# Patient Record
Sex: FEMALE | Race: WHITE | Employment: PART TIME | URBAN - METROPOLITAN AREA
[De-identification: names, ages, dates, MRNs, and addresses within clinical notes are randomized per-mention and may not be internally consistent; named-entity substitution may affect disease eponyms.]

---

## 2021-09-20 ENCOUNTER — VIRTUAL VISIT (OUTPATIENT)
Dept: INTERNAL MEDICINE CLINIC | Age: 26
End: 2021-09-20
Payer: MEDICAID

## 2021-09-20 DIAGNOSIS — F98.8 ATTENTION DEFICIT DISORDER, UNSPECIFIED HYPERACTIVITY PRESENCE: Primary | ICD-10-CM

## 2021-09-20 DIAGNOSIS — Z91.018 MULTIPLE FOOD ALLERGIES: ICD-10-CM

## 2021-09-20 DIAGNOSIS — Z00.00 ANNUAL PHYSICAL EXAM: ICD-10-CM

## 2021-09-20 PROCEDURE — 99213 OFFICE O/P EST LOW 20 MIN: CPT | Performed by: PHYSICIAN ASSISTANT

## 2021-09-20 RX ORDER — METHYLPHENIDATE HCL
10 POWDER (GRAM) MISCELLANEOUS DAILY
Qty: 0.3 G | Refills: 0 | Status: SHIPPED | OUTPATIENT
Start: 2021-10-20 | End: 2022-02-04 | Stop reason: SDUPTHER

## 2021-09-20 RX ORDER — METHYLPHENIDATE HCL
10 POWDER (GRAM) MISCELLANEOUS DAILY
Qty: 0.3 G | Refills: 0 | Status: SHIPPED | OUTPATIENT
Start: 2021-09-20 | End: 2022-02-08 | Stop reason: SDUPTHER

## 2021-09-20 RX ORDER — METHYLPHENIDATE HCL
10 POWDER (GRAM) MISCELLANEOUS DAILY
Qty: 0.3 G | Refills: 0 | Status: SHIPPED | OUTPATIENT
Start: 2021-11-20 | End: 2022-02-08 | Stop reason: SDUPTHER

## 2021-09-20 NOTE — PROGRESS NOTES
1. Have you been to the ER, urgent care clinic since your last visit? Hospitalized since your last visit? No    2. Have you seen or consulted any other health care providers outside of the [address] [place] [address] since your last visit? Include any pap smears or colon screening.  No   Chief Complaint   Patient presents with    Establish Care            please send link to [phone]

## 2021-09-20 NOTE — PROGRESS NOTES
Fernando Martínez is a 27 y.o. female who was seen by synchronous (real-time) audio-video technology on 9/20/2021    Consent: Fernando Martínez, who was seen by synchronous (real-time) audio-video technology, and/or her healthcare decision maker, is aware that this patient-initiated, Telehealth encounter on 9/20/2021 is a billable service, with coverage as determined by her insurance carrier. She is aware that she may receive a bill and has provided verbal consent to proceed: YES  Subjective:   Fernando Martínez is a 27 y.o. female who was seen for [facility] patient. New to [facility]. ADD - Ritalin 10 mg once/day for ADD - dx as an 3 yo - has to have compounded at [id number] due to food allergies. Last filled in May. Needs refill   Taking Ritalin for [facility] program for [employer]. Denies side effects including palpitations, anxiety, & insomnia. Feels more productive on medication. Feels that dose is effective. BP is controlled. Pt reports vitals were normal at GYN   VP WNL    Needs referral to new Allergist per new insurance. Review of Systems   Respiratory: Negative for shortness of breath. Cardiovascular: Negative for chest pain and palpitations. Neurological: Negative for seizures. Psychiatric/Behavioral: Negative for depression. The patient is not nervous/anxious and does not have insomnia.        Objective:     Patient-Reported Vitals 9/20/2021   Patient-Reported Weight 100   Patient-Reported Height 50      General: alert, cooperative, no distress   Mental  status: normal mood, behavior, speech, dress, motor activity, and thought processes, able to follow commands   HENT: NCAT   Neck: no visualized mass   Resp: no respiratory distress   Neuro: no gross deficits   Skin: no discoloration or lesions of concern on visible areas   Psychiatric: normal affect, consistent with stated mood, no evidence of hallucinations     Assessment & Plan:     Encounter Diagnoses ICD-10-CM ICD-9-CM   1. Attention deficit disorder, unspecified hyperactivity presence  F98.8 314.00   2. Multiple food allergies  Z91.018 V15.05   3. Annual physical exam  Z00.00 V70.0     Orders Placed This Encounter    CBC W/O DIFF    METABOLIC PANEL, COMPREHENSIVE    TSH 3RD GENERATION    HEMOGLOBIN A1C WITH EAG    LIPID PANEL    REFERRAL TO ALLERGY    Methylphenidate powd    Methylphenidate powd    Methylphenidate powd   Refills as above    Schedule CE soon. We discussed the expected course, resolution and complications of the diagnosis(es) in detail. Medication risks, benefits, costs, interactions, and alternatives were discussed as indicated. I advised her to contact the office if her condition worsens, changes or fails to improve as anticipated. She expressed understanding with the diagnosis(es) and plan. Monika Carrasquillo is a 27 y.o. female who was evaluated by a video visit encounter for concerns as above. Patient identification was verified prior to start of the visit. A caregiver was present when appropriate. Due to this being a TeleHealth encounter (During [facility] public health emergency), evaluation of the following organ systems was limited: Vitals/Constitutional/EENT/Resp/CV/GI//MS/Neuro/Skin/Heme-Lymph-Imm. Pursuant to the emergency declaration under the [facility] waiver authority and the [employer] General Act, this Virtual  Visit was conducted, with patient's (and/or legal guardian's) consent, to reduce the patient's risk of exposure to COVID-19 and provide necessary medical care. Services were provided through a video synchronous discussion virtually to substitute for in-person clinic visit. Patient and provider were located at their individual homes.       Jacquelyn Muhammad PA-C

## 2022-02-04 DIAGNOSIS — F98.8 ATTENTION DEFICIT DISORDER, UNSPECIFIED HYPERACTIVITY PRESENCE: ICD-10-CM

## 2022-02-04 NOTE — TELEPHONE ENCOUNTER
Future Appointments:  Future Appointments   Date Time Provider Bobby Timmons   2/8/2022  9:45 AM JUSTIN Jeffries BS AMB        Last Appointment With Me:  9/20/2021     Requested Prescriptions     Pending Prescriptions Disp Refills    Methylphenidate powd 0.3 g 0     Sig: Take 10 mg by mouth daily. Max Daily Amount: 10 mg.

## 2022-02-07 RX ORDER — METHYLPHENIDATE HCL
10 POWDER (GRAM) MISCELLANEOUS DAILY
Qty: 0.3 G | Refills: 0 | Status: SHIPPED | OUTPATIENT
Start: 2022-02-07 | End: 2022-06-10 | Stop reason: SDUPTHER

## 2022-02-08 ENCOUNTER — OFFICE VISIT (OUTPATIENT)
Dept: INTERNAL MEDICINE CLINIC | Age: 27
End: 2022-02-08
Payer: MEDICAID

## 2022-02-08 VITALS
WEIGHT: 103 LBS | RESPIRATION RATE: 16 BRPM | HEART RATE: 81 BPM | OXYGEN SATURATION: 100 % | TEMPERATURE: 98.3 F | BODY MASS INDEX: 20.22 KG/M2 | SYSTOLIC BLOOD PRESSURE: 116 MMHG | HEIGHT: 60 IN | DIASTOLIC BLOOD PRESSURE: 64 MMHG

## 2022-02-08 DIAGNOSIS — F98.8 ATTENTION DEFICIT DISORDER, UNSPECIFIED HYPERACTIVITY PRESENCE: ICD-10-CM

## 2022-02-08 DIAGNOSIS — J30.9 ALLERGIC RHINITIS, UNSPECIFIED SEASONALITY, UNSPECIFIED TRIGGER: Primary | ICD-10-CM

## 2022-02-08 PROCEDURE — 99213 OFFICE O/P EST LOW 20 MIN: CPT | Performed by: PHYSICIAN ASSISTANT

## 2022-02-08 RX ORDER — METHYLPHENIDATE HCL
10 POWDER (GRAM) MISCELLANEOUS DAILY
Qty: 0.3 G | Refills: 0 | Status: SHIPPED | OUTPATIENT
Start: 2022-04-07 | End: 2022-06-10 | Stop reason: SDUPTHER

## 2022-02-08 RX ORDER — METHYLPHENIDATE HCL
10 POWDER (GRAM) MISCELLANEOUS DAILY
Qty: 0.3 G | Refills: 0 | Status: SHIPPED | OUTPATIENT
Start: 2022-03-07 | End: 2022-06-10 | Stop reason: SDUPTHER

## 2022-02-08 NOTE — PROGRESS NOTES
1. Have you been to the ER, urgent care clinic since your last visit? Hospitalized since your last visit? No    2. Have you seen or consulted any other health care providers outside of the [address] [place] [address] since your last visit? Include any pap smears or colon screening.  No     Chief Complaint   Patient presents with    Medication Refill

## 2022-02-08 NOTE — PROGRESS NOTES
Alexandria Ritchie is a 27 y.o. female  Chief Complaint   Patient presents with    Medication Refill     Visit Vitals  /64   Pulse 81   Temp 98.3 °F (36.8 °C) (Temporal)   Resp 16   Wt 103 lb (46.7 kg)   SpO2 100%      Health Maintenance Due   Topic Date Due    Hepatitis C Screening  Never done    DTaP/Tdap/Td series (1 - Tdap) Never done    Pap Smear  Never done    COVID-19 Vaccine (2 - Pfizer 3-dose series) 11/28/2021     HPI  ADD follow up - taking Ritalin most days. Planning to start taking daily now due to work. Denies side effects including palpitations, anxiety, & insomnia. Feels more productive on medication. Feels that dose is effective. BP is controlled. Allergies have been worse with having to move her 6 chickens into the attic due to bird flu risk recommendations from [place]. Planning to move the chickens back outside soon. Loves her chickens - gets eggs of many colors, and they are very affectionate. They wear chicken diapers in the house. ROS  Review of Systems   Constitutional: Negative for fever. HENT: Positive for congestion. Respiratory: Negative for shortness of breath. Cardiovascular: Negative for chest pain and palpitations. Skin: Positive for rash. eczema   Neurological: Negative for dizziness, loss of consciousness and weakness. Psychiatric/Behavioral: The patient is not nervous/anxious. EXAM  Physical Exam  Vitals and nursing note reviewed. Constitutional:       General: She is not in acute distress. Appearance: She is well-developed. HENT:      Head: Normocephalic and atraumatic. Neck:      Vascular: No JVD. Cardiovascular:      Rate and Rhythm: Normal rate and regular rhythm. Heart sounds: Normal heart sounds. Pulmonary:      Effort: Pulmonary effort is normal. No respiratory distress. Breath sounds: Normal breath sounds. Musculoskeletal:      Cervical back: Neck supple. Skin:     General: Skin is warm and dry. Neurological:      Mental Status: She is alert and oriented to person, place, and time. Psychiatric:         Mood and Affect: Mood normal.         Behavior: Behavior normal.         Thought Content: Thought content normal.         Judgment: Judgment normal.       ASSESSMENT/PLAN  Encounter Diagnoses     ICD-10-CM ICD-9-CM   1. Allergic rhinitis, unspecified seasonality, unspecified trigger  J30.9 477.9   2. Attention deficit disorder, unspecified hyperactivity presence  F98.8 314.00     Orders Placed This Encounter    Methylphenidate powd    Methylphenidate powd   Follow up with Allergist as planned.

## 2022-03-19 PROBLEM — J30.9 ALLERGIC RHINITIS, UNSPECIFIED SEASONALITY, UNSPECIFIED TRIGGER: Status: ACTIVE | Noted: 2022-02-08

## 2022-03-19 PROBLEM — F98.8 ATTENTION DEFICIT DISORDER, UNSPECIFIED HYPERACTIVITY PRESENCE: Status: ACTIVE | Noted: 2022-02-08

## 2022-03-19 LAB
ALBUMIN SERPL-MCNC: 4.6 G/DL (ref 3.9–5)
ALBUMIN/GLOB SERPL: 2 {RATIO} (ref 1.2–2.2)
ALP SERPL-CCNC: 64 IU/L (ref 44–121)
ALT SERPL-CCNC: 11 IU/L (ref 0–32)
AST SERPL-CCNC: 15 IU/L (ref 0–40)
BILIRUB SERPL-MCNC: 0.6 MG/DL (ref 0–1.2)
BUN SERPL-MCNC: 13 MG/DL (ref 6–20)
BUN/CREAT SERPL: 18 (ref 9–23)
CALCIUM SERPL-MCNC: 9.6 MG/DL (ref 8.7–10.2)
CHLORIDE SERPL-SCNC: 103 MMOL/L (ref 96–106)
CHOLEST SERPL-MCNC: 202 MG/DL (ref 100–199)
CO2 SERPL-SCNC: 22 MMOL/L (ref 20–29)
CREAT SERPL-MCNC: 0.72 MG/DL (ref 0.57–1)
EGFR: 117 ML/MIN/1.73
ERYTHROCYTE [DISTWIDTH] IN BLOOD BY AUTOMATED COUNT: 12.6 % (ref 11.7–15.4)
EST. AVERAGE GLUCOSE BLD GHB EST-MCNC: 94 MG/DL
GLOBULIN SER CALC-MCNC: 2.3 G/DL (ref 1.5–4.5)
GLUCOSE SERPL-MCNC: 93 MG/DL (ref 65–99)
HBA1C MFR BLD: 4.9 % (ref 4.8–5.6)
HCT VFR BLD AUTO: 42.6 % (ref 34–46.6)
HDLC SERPL-MCNC: 61 MG/DL
HGB BLD-MCNC: 13.7 G/DL (ref 11.1–15.9)
IMP & REVIEW OF LAB RESULTS: NORMAL
INTERPRETATION: NORMAL
LDLC SERPL CALC-MCNC: 126 MG/DL (ref 0–99)
MCH RBC QN AUTO: 29.7 PG (ref 26.6–33)
MCHC RBC AUTO-ENTMCNC: 32.2 G/DL (ref 31.5–35.7)
MCV RBC AUTO: 92 FL (ref 79–97)
PLATELET # BLD AUTO: 377 X10E3/UL (ref 150–450)
POTASSIUM SERPL-SCNC: 4.3 MMOL/L (ref 3.5–5.2)
PROT SERPL-MCNC: 6.9 G/DL (ref 6–8.5)
RBC # BLD AUTO: 4.62 X10E6/UL (ref 3.77–5.28)
SODIUM SERPL-SCNC: 138 MMOL/L (ref 134–144)
TRIGL SERPL-MCNC: 85 MG/DL (ref 0–149)
TSH SERPL DL<=0.005 MIU/L-ACNC: 0.83 UIU/ML (ref 0.45–4.5)
VLDLC SERPL CALC-MCNC: 15 MG/DL (ref 5–40)
WBC # BLD AUTO: 5.8 X10E3/UL (ref 3.4–10.8)

## 2022-03-21 PROBLEM — E78.00 HYPERCHOLESTEREMIA: Status: ACTIVE | Noted: 2022-03-21

## 2022-03-21 NOTE — PROGRESS NOTES
Blood Count, thyroid, liver enzymes, kidney function, sugar levels, and electrolytes are all normal/acceptable. Good! Cholesterol is slightly high. Decrease fried/fatty foods, red meat, butter, oils, and increase cardio exercise.

## 2022-03-24 PROBLEM — E78.00 HYPERCHOLESTEREMIA: Status: ACTIVE | Noted: 2022-03-21

## 2022-06-10 ENCOUNTER — OFFICE VISIT (OUTPATIENT)
Dept: INTERNAL MEDICINE CLINIC | Age: 27
End: 2022-06-10
Payer: MEDICAID

## 2022-06-10 VITALS
OXYGEN SATURATION: 99 % | DIASTOLIC BLOOD PRESSURE: 64 MMHG | BODY MASS INDEX: 19.36 KG/M2 | HEART RATE: 84 BPM | RESPIRATION RATE: 16 BRPM | SYSTOLIC BLOOD PRESSURE: 111 MMHG | HEIGHT: 60 IN | TEMPERATURE: 98.3 F | WEIGHT: 98.6 LBS

## 2022-06-10 DIAGNOSIS — F98.8 ATTENTION DEFICIT DISORDER, UNSPECIFIED HYPERACTIVITY PRESENCE: ICD-10-CM

## 2022-06-10 DIAGNOSIS — B35.4 TINEA CORPORIS: ICD-10-CM

## 2022-06-10 DIAGNOSIS — E78.00 HYPERCHOLESTEREMIA: Primary | ICD-10-CM

## 2022-06-10 PROCEDURE — 99214 OFFICE O/P EST MOD 30 MIN: CPT | Performed by: PHYSICIAN ASSISTANT

## 2022-06-10 RX ORDER — CHLORPHENIRAMINE MALEATE 4 MG
TABLET ORAL 2 TIMES DAILY
Qty: 15 G | Refills: 0 | Status: SHIPPED | OUTPATIENT
Start: 2022-06-10

## 2022-06-10 RX ORDER — METHYLPHENIDATE HCL
10 POWDER (GRAM) MISCELLANEOUS DAILY
Qty: 0.3 G | Refills: 0 | Status: SHIPPED | OUTPATIENT
Start: 2022-06-10 | End: 2022-10-20 | Stop reason: SDUPTHER

## 2022-06-10 RX ORDER — METHYLPHENIDATE HCL
10 POWDER (GRAM) MISCELLANEOUS DAILY
Qty: 0.3 G | Refills: 0 | Status: SHIPPED | OUTPATIENT
Start: 2022-08-10

## 2022-06-10 RX ORDER — METHYLPHENIDATE HCL
10 POWDER (GRAM) MISCELLANEOUS DAILY
Qty: 0.3 G | Refills: 0 | Status: SHIPPED | OUTPATIENT
Start: 2022-07-10

## 2022-06-10 NOTE — PROGRESS NOTES
1. Have you been to the ER, urgent care clinic since your last visit? Hospitalized since your last visit? No    2. Have you seen or consulted any other health care providers outside of the [address] [place] [address] since your last visit? Include any pap smears or colon screening.  Yes        Chief Complaint   Patient presents with    Cholesterol Problem    Rash     to left foot

## 2022-06-10 NOTE — PROGRESS NOTES
Krupa Yip is a 27 y.o. female  Chief Complaint   Patient presents with    Cholesterol Problem    Rash     to left foot     Visit Vitals  /64   Pulse 84   Temp 98.3 °F (36.8 °C) (Temporal)   Resp 16   Ht 5' (1.524 m)   Wt 98 lb 9.6 oz (44.7 kg)   SpO2 99%   BMI 19.26 kg/m²      Health Maintenance Due   Topic Date Due    Hepatitis C Screening  Never done    DTaP/Tdap/Td series (1 - Tdap) Never done    Pap Smear  Never done    COVID-19 Vaccine (2 - Pfizer 3-dose series) 11/28/2021       HPI    Rash on foot x ~2 months. Seems to be somewhat improving with eczema cream. Asymptomatic. No known exposures. They still have their 6 pet chickens in the attic to avoid Bret Flu, but the chickens are healthy. No sign of ringworm. Hx slightly elevated cholesterol, but eating a very healthy diet. Lab Results   Component Value Date/Time    Cholesterol, total 202 (H) 03/18/2022 07:59 AM    HDL Cholesterol 61 03/18/2022 07:59 AM    LDL, calculated 126 (H) 03/18/2022 07:59 AM    VLDL, calculated 15 03/18/2022 07:59 AM    Triglyceride 85 03/18/2022 07:59 AM     ADD follow up - taking Methylphenidate regularly. Denies side effects including palpitations, anxiety, & insomnia. Feels more productive on medication. Feels that dose is effective. BP is controlled. Weight is WNL. Wt Readings from Last 6 Encounters:   06/10/22 98 lb 9.6 oz (44.7 kg)   02/08/22 103 lb (46.7 kg)       ROS  Review of Systems   Constitutional: Negative for fever and malaise/fatigue. Respiratory: Negative for shortness of breath. Cardiovascular: Negative for chest pain and palpitations. Skin: Positive for rash. Negative for itching. Neurological: Negative for dizziness, loss of consciousness and weakness. EXAM  Physical Exam  Vitals and nursing note reviewed. Constitutional:       General: She is not in acute distress. Appearance: She is well-developed. HENT:      Head: Normocephalic and atraumatic. Neck:      Vascular: No JVD. Cardiovascular:      Rate and Rhythm: Normal rate and regular rhythm. Heart sounds: Normal heart sounds. Pulmonary:      Effort: Pulmonary effort is normal. No respiratory distress. Breath sounds: Normal breath sounds. Musculoskeletal:      Cervical back: Neck supple. Skin:     General: Skin is warm and dry. Comments: R foot with faint Rash with central clearing and peripheral red, lacy border. Neurological:      Mental Status: She is alert and oriented to person, place, and time. Psychiatric:         Mood and Affect: Mood normal.         Behavior: Behavior normal.         Thought Content: Thought content normal.         Judgment: Judgment normal.       ASSESSMENT/PLAN  Encounter Diagnoses     ICD-10-CM ICD-9-CM   1. Hypercholesteremia  E78.00 272.0   2. Tinea corporis  B35.4 110.5   3. Attention deficit disorder, unspecified hyperactivity presence  F98.8 314.00     Orders Placed This Encounter    clotrimazole (LOTRIMIN) 1 % topical cream    Refills:   Methylphenidate powd    Methylphenidate powd    Methylphenidate powd   Call [facility] 1 month for Diflucan Rx.

## 2022-08-16 ENCOUNTER — PATIENT MESSAGE (OUTPATIENT)
Dept: INTERNAL MEDICINE CLINIC | Age: 27
End: 2022-08-16

## 2022-08-16 DIAGNOSIS — B35.4 TINEA CORPORIS: Primary | ICD-10-CM

## 2022-08-17 RX ORDER — FLUCONAZOLE 150 MG/1
150 TABLET ORAL
Qty: 6 TABLET | Refills: 0 | Status: SHIPPED | OUTPATIENT
Start: 2022-08-17 | End: 2022-10-05 | Stop reason: SDUPTHER

## 2022-08-17 NOTE — TELEPHONE ENCOUNTER
From: Paola Spear  To: Mac Riedel, PA-C  Sent: 8/16/2022 11:11 AM EDT  Subject: Rash on foot hasnt gone away     Hello! I saw Dr. Anusha Fox and she gave me medicine for my ringworm on my foot. It is still there and she told me to contact yall if it hadnt gone away. She said she would prescribe some medication. Do I need to make an appointment to come in to get the prescription? Thanks!

## 2022-09-06 RX ORDER — TRIAMCINOLONE ACETONIDE 1 MG/G
OINTMENT TOPICAL 2 TIMES DAILY
Qty: 30 G | Refills: 1 | Status: SHIPPED | OUTPATIENT
Start: 2022-09-06

## 2022-10-05 ENCOUNTER — OFFICE VISIT (OUTPATIENT)
Dept: INTERNAL MEDICINE CLINIC | Age: 27
End: 2022-10-05
Payer: MEDICAID

## 2022-10-05 VITALS
OXYGEN SATURATION: 99 % | DIASTOLIC BLOOD PRESSURE: 76 MMHG | BODY MASS INDEX: 19.75 KG/M2 | HEIGHT: 60 IN | RESPIRATION RATE: 18 BRPM | WEIGHT: 100.6 LBS | HEART RATE: 73 BPM | TEMPERATURE: 98.2 F | SYSTOLIC BLOOD PRESSURE: 106 MMHG

## 2022-10-05 DIAGNOSIS — R22.32 ARM MASS, LEFT: Primary | ICD-10-CM

## 2022-10-05 DIAGNOSIS — B35.4 TINEA CORPORIS: ICD-10-CM

## 2022-10-05 PROCEDURE — 99214 OFFICE O/P EST MOD 30 MIN: CPT | Performed by: PHYSICIAN ASSISTANT

## 2022-10-05 RX ORDER — FLUCONAZOLE 150 MG/1
150 TABLET ORAL
Qty: 6 TABLET | Refills: 0 | Status: SHIPPED | OUTPATIENT
Start: 2022-10-05 | End: 2022-11-10

## 2022-10-05 NOTE — PROGRESS NOTES
Riccardo Dent is a 32 y.o. female  Chief Complaint   Patient presents with    Cyst     Patient states there is a bump on her left arm from years ago that has moved to her lower forearm      Visit Vitals  /76 (BP 1 Location: Left upper arm, BP Patient Position: Sitting, BP Cuff Size: Adult)   Pulse 73   Temp 98.2 °F (36.8 °C) (Temporal)   Resp 18   Ht 5' (1.524 m)   Wt 100 lb 9.6 oz (45.6 kg)   SpO2 99%   BMI 19.65 kg/m²      Health Maintenance Due   Topic Date Due    Hepatitis C Screening  Never done    DTaP/Tdap/Td series (1 - Tdap) Never done    COVID-19 Vaccine (2 - Pfizer series) 11/28/2021    Flu Vaccine (1) 08/01/2022       HPI  L arm with small lump s/p IV sedation 5-6 years ago for a dental procedure. Lump seems to have moved. Not painful/tender/hot. No associated swelling. L foot still has tinea rash despite trying fungal cream.     Pt's pet chickens in the attic are healthy & still lovingly spoiled. ROS  Review of Systems   Constitutional:  Negative for fever and malaise/fatigue. Respiratory:  Negative for cough and shortness of breath. Cardiovascular:  Negative for chest pain and palpitations. Skin:  Positive for itching and rash. Neurological:  Negative for dizziness, loss of consciousness and weakness. EXAM  Physical Exam  Vitals and nursing note reviewed. Constitutional:       General: She is not in acute distress. Appearance: She is well-developed. HENT:      Head: Normocephalic and atraumatic. Neck:      Vascular: No JVD. Cardiovascular:      Rate and Rhythm: Normal rate and regular rhythm. Heart sounds: Normal heart sounds. Pulmonary:      Effort: Pulmonary effort is normal. No respiratory distress. Breath sounds: Normal breath sounds. Musculoskeletal:      Cervical back: Neck supple. Skin:     General: Skin is warm and dry. Comments: L foot with Rash with central clearing and peripheral red, lacy border.    L arm with small, mobile, hard lump. No erythema or tenderness. Neurological:      Mental Status: She is alert and oriented to person, place, and time. Psychiatric:         Mood and Affect: Mood normal.         Behavior: Behavior normal.         Thought Content: Thought content normal.         Judgment: Judgment normal.     ASSESSMENT/PLAN  Encounter Diagnoses     ICD-10-CM ICD-9-CM   1. Arm mass, left  R22.32 782.2   2. Tinea corporis  B35.4 110.5     Orders Placed This Encounter    fluconazole (DIFLUCAN) 150 mg tablet   Venous Ultrasound ordered L arm.

## 2022-10-05 NOTE — PROGRESS NOTES
Reviewed record in preparation for visit and have obtained necessary documentation. Identified pt with two pt identifiers(name and ). Chief Complaint   Patient presents with    Cyst     Patient states there is a bump on her left arm from years ago that has moved to her lower forearm      Blood pressure 106/76, pulse 73, temperature 98.2 °F (36.8 °C), temperature source Temporal, resp. rate 18, height 5' (1.524 m), weight 100 lb 9.6 oz (45.6 kg), SpO2 99 %. Health Maintenance Due   Topic Date Due    Hepatitis C Test  Never done    DTaP/Tdap/Td  (1 - Tdap) Never done    COVID-19 Vaccine (2 - Pfizer series) 2021    Yearly Flu Vaccine (1) 2022       Ms. Taylor Finley has a reminder for a \"due or due soon\" health maintenance. I have asked that she discuss this further with her primary care provider for follow-up on this health maintenance. Coordination of Care Questionnaire:  :     1) Have you been to an emergency room, urgent care clinic since your last visit? no   Hospitalized since your last visit? no             2) Have you seen or consulted any other health care providers outside of [address] [place] [address] since your last visit? no  (Include any pap smears or colon screenings in this section.)    3) In the event something were to happen to you and you were unable to speak on your behalf, do you have an Advance Directive/ Living Will in place stating your wishes?  NO

## 2022-10-14 ENCOUNTER — TELEPHONE (OUTPATIENT)
Dept: INTERNAL MEDICINE CLINIC | Age: 27
End: 2022-10-14

## 2022-10-14 ENCOUNTER — HOSPITAL ENCOUNTER (OUTPATIENT)
Dept: VASCULAR SURGERY | Age: 27
Discharge: HOME | End: 2022-10-14
Attending: PHYSICIAN ASSISTANT
Payer: MEDICAID

## 2022-10-14 DIAGNOSIS — R22.32 ARM MASS, LEFT: ICD-10-CM

## 2022-10-14 PROCEDURE — 93971 EXTREMITY STUDY: CPT

## 2022-10-14 NOTE — TELEPHONE ENCOUNTER
Kirsten Keyes calling back to report pt's test results. Pt was negative DVT in her left arm    Pt was positive superficial in her left arm where the lump was present.

## 2022-10-14 NOTE — TELEPHONE ENCOUNTER
[address] called to get clarification on which test the pt should be receiving today. Please advise.

## 2022-10-16 NOTE — PROGRESS NOTES
Superficial blood clot seen, but no concern for this becoming a PE/DVT/cardiac/stroke issue. This clot will probably EVENTUALLY go away but it may take a very long time. No need to worry meanwhile!

## 2022-12-12 DIAGNOSIS — F98.8 ATTENTION DEFICIT DISORDER, UNSPECIFIED HYPERACTIVITY PRESENCE: ICD-10-CM

## 2022-12-13 RX ORDER — METHYLPHENIDATE HCL
10 POWDER (GRAM) MISCELLANEOUS DAILY
Qty: 0.3 G | Refills: 0 | Status: SHIPPED | OUTPATIENT
Start: 2022-12-13

## 2022-12-13 RX ORDER — METHYLPHENIDATE HCL
10 POWDER (GRAM) MISCELLANEOUS DAILY
Qty: 0.3 G | Refills: 0 | Status: SHIPPED | OUTPATIENT
Start: 2023-01-13

## 2022-12-13 RX ORDER — METHYLPHENIDATE HCL
10 POWDER (GRAM) MISCELLANEOUS DAILY
Qty: 0.3 G | Refills: 0 | Status: SHIPPED | OUTPATIENT
Start: 2023-02-13

## 2023-04-28 ENCOUNTER — OFFICE VISIT (OUTPATIENT)
Dept: INTERNAL MEDICINE CLINIC | Age: 28
End: 2023-04-28

## 2023-04-28 VITALS
TEMPERATURE: 97.7 F | OXYGEN SATURATION: 97 % | WEIGHT: 102 LBS | DIASTOLIC BLOOD PRESSURE: 67 MMHG | SYSTOLIC BLOOD PRESSURE: 110 MMHG | HEART RATE: 80 BPM | RESPIRATION RATE: 14 BRPM | BODY MASS INDEX: 20.03 KG/M2 | HEIGHT: 60 IN

## 2023-04-28 DIAGNOSIS — L71.0 PERIORAL DERMATITIS: ICD-10-CM

## 2023-04-28 DIAGNOSIS — R21 RASH: ICD-10-CM

## 2023-04-28 DIAGNOSIS — F41.9 ANXIETY: ICD-10-CM

## 2023-04-28 DIAGNOSIS — F98.8 ATTENTION DEFICIT DISORDER, UNSPECIFIED HYPERACTIVITY PRESENCE: ICD-10-CM

## 2023-04-28 DIAGNOSIS — F42.9 OBSESSIVE-COMPULSIVE DISORDER, UNSPECIFIED TYPE: Primary | ICD-10-CM

## 2023-04-28 RX ORDER — METHYLPHENIDATE HCL
10 POWDER (GRAM) MISCELLANEOUS DAILY
Qty: 0.3 G | Refills: 0 | Status: SHIPPED | OUTPATIENT
Start: 2023-04-28

## 2023-04-28 RX ORDER — BUPROPION HYDROCHLORIDE 150 MG/1
150 TABLET ORAL
Qty: 30 TABLET | Refills: 0 | Status: SHIPPED | OUTPATIENT
Start: 2023-04-28

## 2023-04-28 RX ORDER — METRONIDAZOLE 10 MG/G
GEL TOPICAL DAILY
Qty: 45 G | Refills: 5 | Status: SHIPPED | OUTPATIENT
Start: 2023-04-28

## 2023-04-28 NOTE — PROGRESS NOTES
Juan Manuel Swain is a 32 y.o. female  Chief Complaint   Patient presents with    Rash     On going on her foot - would like medication refill - would like to get tested for allergies     Anxiety     Having overwhelming feelings      Visit Vitals  /67 (BP 1 Location: Left upper arm, BP Patient Position: Sitting, BP Cuff Size: Adult)   Pulse 80   Temp 97.7 °F (36.5 °C) (Temporal)   Resp 14   Ht 5' (1.524 m)   Wt 102 lb (46.3 kg)   SpO2 97%   BMI 19.92 kg/m²      Health Maintenance Due   Topic Date Due    Hepatitis C Screening  Never done    Varicella Vaccine (1 of 2 - 2-dose childhood series) Never done    DTaP/Tdap/Td series (1 - Tdap) Never done    COVID-19 Vaccine (2 - Pfizer series) 11/28/2021       HPI  Lots of stress with chicken (pet) in heart failure, live in boyfriend doing self-harm (punching self in face/hitting self with metal cup/whisk). Pt and BF are in couples counseling and pt is doing individual counseling every 2 weeks with the same counselor she's had for many years. In the last month of DxNA school and classmates are very unpleasant. Hasn't been taking Ritalin due to anxiety - fear that Ritalin would worsen it. In the past Ritalin has not worsened her anxiety. Hx OCD which has been flaring as well. Feels she would benefit from an antidepressant. Zoloft and Paxil caused side effects     Has had a rash around mouth/chin for months. Allergist suggested using steroid cream on it, but that has worsened the rash. Rash on L foot has not improved with steroid cream/antifungals x months. ROS  Review of Systems   Constitutional:  Positive for malaise/fatigue. Negative for fever. Respiratory:  Negative for shortness of breath. Cardiovascular:  Negative for chest pain and palpitations. Skin:  Positive for itching and rash. Neurological:  Negative for dizziness, loss of consciousness and weakness. Psychiatric/Behavioral:  Positive for depression.  Negative for substance abuse and suicidal ideas. The patient is nervous/anxious. EXAM  Physical Exam  Vitals and nursing note reviewed. Constitutional:       General: She is not in acute distress. Appearance: She is well-developed. HENT:      Head: Normocephalic and atraumatic. Neck:      Vascular: No JVD. Cardiovascular:      Rate and Rhythm: Normal rate and regular rhythm. Heart sounds: Normal heart sounds. Pulmonary:      Effort: Pulmonary effort is normal. No respiratory distress. Breath sounds: Normal breath sounds. Musculoskeletal:      Cervical back: Neck supple. Skin:     General: Skin is warm and dry. Comments: Dry, red, scaling skin around mouth   Neurological:      Mental Status: She is alert and oriented to person, place, and time. Psychiatric:         Behavior: Behavior normal.         Thought Content: Thought content normal.         Judgment: Judgment normal.      Comments: Appropriately tearful given subject matter during history. Recovers normally. ASSESSMENT/PLAN  Encounter Diagnoses     ICD-10-CM ICD-9-CM   1. Obsessive-compulsive disorder, unspecified type  F42.9 300.3   2. Attention deficit disorder, unspecified hyperactivity presence  F98.8 314.00   3. Anxiety  F41.9 300.00   4. Perioral dermatitis  L71.0 695.3   5. Rash  R21 782.1     Orders Placed This Encounter    REFERRAL TO DERMATOLOGY    Refill: Methylphenidate powd    Start: buPROPion XL (WELLBUTRIN XL) 150 mg tablet    Start: metroNIDAZOLE (METROGEL) 1 % topical gel   Stop topical steroid on face! Continue counseling. Discussed self care.

## 2023-05-10 ENCOUNTER — TELEPHONE (OUTPATIENT)
Age: 28
End: 2023-05-10

## 2023-05-10 DIAGNOSIS — L71.0 PERIORAL DERMATITIS: Primary | ICD-10-CM

## 2023-05-10 RX ORDER — IVERMECTIN 10 MG/G
CREAM TOPICAL
Qty: 30 G | Refills: 5 | Status: SHIPPED | OUTPATIENT
Start: 2023-05-10

## 2023-05-10 NOTE — TELEPHONE ENCOUNTER
Pt states that metroNIDAZOLE (METROGEL) 1 % topical gel is making the rash on her face worse. She's not sure why, but she says she is allergic to corn, so if that is involved at all in the process of making the medication, it could be the culprit. Does her PCP have any ideas/ suggestions?

## 2023-05-10 NOTE — TELEPHONE ENCOUNTER
I doubt corn is involved in making the cream, but definitely stop using it. I'll send in a different cream, but please tell the pt to not buy it if it's expensive. Remind her to schedule with Derm if she hasn't already. I'd like to send in Doxycycline oral antibiotic to help with this rash, but apparently it might contain corn? Please get details of her reaction to corn and ask if she's ever taken Doxycycline before?

## 2023-05-10 NOTE — TELEPHONE ENCOUNTER
Called pt in regards of last message and PCP has responded    \"I doubt corn is involved in making the cream, but definitely stop using it. I'll send in a different cream, but please tell the pt to not buy it if it's expensive. Remind her to schedule with Derm if she hasn't already. I'd like to send in Doxycycline oral antibiotic to help with this rash, but apparently it might contain corn? Please get details of her reaction to corn and ask if she's ever taken Doxycycline before?  \"    LVM to give the office a call back

## 2023-05-16 ENCOUNTER — TELEPHONE (OUTPATIENT)
Age: 28
End: 2023-05-16

## 2023-05-16 NOTE — TELEPHONE ENCOUNTER
I sent in a different cream (Ivermectin) on 5/10/23. I sent this message to the nurse pool on 5/10/23, but it looks like she didn't answer:   \"  I doubt corn is involved in making the cream, but definitely stop using it. I'll send in a different cream, but please tell the pt to not buy it if it's expensive. Remind her to schedule with Derm if she hasn't already. I'd like to send in Doxycycline oral antibiotic to help with this rash, but apparently it might contain corn? Please get details of her reaction to corn and ask if she's ever taken Doxycycline before?    \"

## 2023-05-16 NOTE — TELEPHONE ENCOUNTER
----- Message from Shefali Ayon sent at 5/15/2023  4:02 PM EDT -----  Subject: Message to Provider    QUESTIONS  Information for Provider? Patient called in she would like some to contact   her about the cream for her eczema cream that keeps making it worse please   call patient   ---------------------------------------------------------------------------  --------------  [id number] [employer]  [phone]; OK to leave message on voicemail  ---------------------------------------------------------------------------  --------------  SCRIPT ANSWERS  Relationship to Patient?  Self

## 2023-05-17 ENCOUNTER — TELEPHONE (OUTPATIENT)
Age: 28
End: 2023-05-17

## 2023-05-17 NOTE — TELEPHONE ENCOUNTER
Called pt in regards of last message addressing her eczema cream, pt states that the cream is making eczema cream is making condition worse not better. PCP responded      \"I sent in a different cream (Ivermectin) on 5/10/23. I sent this message to the nurse pool on 5/10/23, but it looks like she didn't answer:     I doubt corn is involved in making the cream, but definitely stop using it. I'll send in a different cream, but please tell the pt to not buy it if it's expensive. Remind her to schedule with Derm if she hasn't already. I'd like to send in Doxycycline oral antibiotic to help with this rash, but apparently it might contain corn? Please get details of her reaction to corn and ask if she's ever taken Doxycycline before?  \"    Patient was unavailable [facility]

## 2023-05-18 ENCOUNTER — TELEPHONE (OUTPATIENT)
Age: 28
End: 2023-05-18

## 2023-05-19 ENCOUNTER — TELEPHONE (OUTPATIENT)
Age: 28
End: 2023-05-19

## 2023-05-19 DIAGNOSIS — L71.0 PERIORAL DERMATITIS: Primary | ICD-10-CM

## 2023-05-19 RX ORDER — BUPROPION HYDROCHLORIDE 150 MG/1
150 TABLET ORAL EVERY MORNING
Qty: 90 TABLET | Refills: 1 | Status: SHIPPED | OUTPATIENT
Start: 2023-05-19

## 2023-05-19 RX ORDER — LEVOCETIRIZINE DIHYDROCHLORIDE 5 MG/1
TABLET, FILM COATED ORAL
COMMUNITY

## 2023-05-19 RX ORDER — DOXYCYCLINE HYCLATE 100 MG
100 TABLET ORAL 2 TIMES DAILY
Qty: 20 TABLET | Refills: 0 | Status: SHIPPED | OUTPATIENT
Start: 2023-05-19 | End: 2023-05-29

## 2023-05-19 RX ORDER — METHYLPHENIDATE HCL
10 POWDER (GRAM) MISCELLANEOUS DAILY
COMMUNITY
Start: 2023-04-28

## 2023-05-19 RX ORDER — CYCLOSPORINE 0.5 MG/ML
EMULSION OPHTHALMIC
COMMUNITY
Start: 2023-04-23

## 2023-05-19 RX ORDER — NORETHINDRONE ACETATE AND ETHINYL ESTRADIOL, ETHINYL ESTRADIOL AND FERROUS FUMARATE 1MG-10(24)
KIT ORAL
COMMUNITY

## 2023-05-19 RX ORDER — BUPROPION HYDROCHLORIDE 150 MG/1
1 TABLET ORAL EVERY MORNING
COMMUNITY
Start: 2023-04-28 | End: 2023-05-19 | Stop reason: SDUPTHER

## 2023-05-19 NOTE — TELEPHONE ENCOUNTER
Called pt to let her know we have received her message and PCP has responded     \"Recommend evisit with pictures, VV, or OV \"    LVM

## 2023-05-20 SDOH — ECONOMIC STABILITY: HOUSING INSECURITY
IN THE LAST 12 MONTHS, WAS THERE A TIME WHEN YOU DID NOT HAVE A STEADY PLACE TO SLEEP OR SLEPT IN A SHELTER (INCLUDING NOW)?: NO

## 2023-05-20 SDOH — ECONOMIC STABILITY: TRANSPORTATION INSECURITY
IN THE PAST 12 MONTHS, HAS LACK OF TRANSPORTATION KEPT YOU FROM MEETINGS, WORK, OR FROM GETTING THINGS NEEDED FOR DAILY LIVING?: NO

## 2023-05-20 SDOH — ECONOMIC STABILITY: FOOD INSECURITY: WITHIN THE PAST 12 MONTHS, THE FOOD YOU BOUGHT JUST DIDN'T LAST AND YOU DIDN'T HAVE MONEY TO GET MORE.: NEVER TRUE

## 2023-05-20 SDOH — ECONOMIC STABILITY: FOOD INSECURITY: WITHIN THE PAST 12 MONTHS, YOU WORRIED THAT YOUR FOOD WOULD RUN OUT BEFORE YOU GOT MONEY TO BUY MORE.: NEVER TRUE

## 2023-05-20 SDOH — ECONOMIC STABILITY: INCOME INSECURITY: HOW HARD IS IT FOR YOU TO PAY FOR THE VERY BASICS LIKE FOOD, HOUSING, MEDICAL CARE, AND HEATING?: NOT VERY HARD

## 2023-05-22 ENCOUNTER — OFFICE VISIT (OUTPATIENT)
Age: 28
End: 2023-05-22
Payer: MEDICAID

## 2023-05-22 VITALS
DIASTOLIC BLOOD PRESSURE: 70 MMHG | WEIGHT: 99.4 LBS | BODY MASS INDEX: 19.52 KG/M2 | SYSTOLIC BLOOD PRESSURE: 105 MMHG | HEART RATE: 73 BPM | RESPIRATION RATE: 14 BRPM | TEMPERATURE: 98 F | OXYGEN SATURATION: 98 % | HEIGHT: 60 IN

## 2023-05-22 DIAGNOSIS — F98.8 ATTENTION DEFICIT DISORDER, UNSPECIFIED HYPERACTIVITY PRESENCE: Primary | ICD-10-CM

## 2023-05-22 DIAGNOSIS — L71.0 PERIORAL DERMATITIS: ICD-10-CM

## 2023-05-22 DIAGNOSIS — Z63.0 PARTNER RELATIONSHIP PROBLEM: ICD-10-CM

## 2023-05-22 PROCEDURE — 99214 OFFICE O/P EST MOD 30 MIN: CPT | Performed by: PHYSICIAN ASSISTANT

## 2023-05-22 RX ORDER — BUPROPION HYDROCHLORIDE 150 MG/1
150 TABLET ORAL EVERY MORNING
Qty: 90 TABLET | Refills: 1 | Status: SHIPPED | OUTPATIENT
Start: 2023-05-22

## 2023-05-22 SDOH — SOCIAL STABILITY - SOCIAL INSECURITY: PROBLEMS IN RELATIONSHIP WITH SPOUSE OR PARTNER: Z63.0

## 2023-05-22 ASSESSMENT — ENCOUNTER SYMPTOMS
SHORTNESS OF BREATH: 0
COUGH: 0

## 2023-05-22 NOTE — PROGRESS NOTES
Cee Medina is a 32 y.o. female  Chief Complaint   Patient presents with    Other     Medication evaluation - face is red      Vitals:    05/22/23 1342   BP: 105/70   Pulse: 73   Resp: 14   Temp: 98 °F (36.7 °C)   SpO2: 98%      Wt Readings from Last 3 Encounters:   05/22/23 99 lb 6.4 oz (45.1 kg)   04/28/23 102 lb (46.3 kg)   10/05/22 100 lb 9.6 oz (45.6 kg)     BMI Readings from Last 3 Encounters:   05/22/23 19.41 kg/m²   04/28/23 19.92 kg/m²   10/05/22 19.65 kg/m²     Health Maintenance Due   Topic Date Due    Varicella vaccine (1 of 2 - 2-dose childhood series) Never done    HIV screen  Never done    Hepatitis C screen  Never done    DTaP/Tdap/Td vaccine (1 - Tdap) Never done    COVID-19 Vaccine (2 - Pfizer series) 11/28/2021     HPI  Withdrawing from topical steroids on face. Doxycycline is starting to help now. Saw Derm today and they agreed with Doxycycline plan + gave a cream & shampoo to try for face. Lots of stress with boyfriend's self harm issues. Considering leaving him because he has refused to treat his mental health for months. Seeing couples therapist with him. Pt notes that Wellbutrin has helped with focus and staying calm. Happy with current dose. ROS  Review of Systems   Constitutional:  Negative for fever. Respiratory:  Negative for cough and shortness of breath. Cardiovascular:  Negative for chest pain and palpitations. Neurological:  Negative for headaches. Psychiatric/Behavioral:  Negative for dysphoric mood. EXAM  Physical Exam  Vitals and nursing note reviewed. Constitutional:       Appearance: Normal appearance. HENT:      Head: Normocephalic and atraumatic. Neck:      Vascular: No carotid bruit. Cardiovascular:      Rate and Rhythm: Normal rate and regular rhythm. Heart sounds: Normal heart sounds. Pulmonary:      Effort: Pulmonary effort is normal. No respiratory distress. Breath sounds: Normal breath sounds.    Musculoskeletal:

## 2023-05-26 RX ORDER — METRONIDAZOLE 10 MG/G
GEL TOPICAL DAILY
COMMUNITY
Start: 2023-04-28

## 2023-11-06 ENCOUNTER — TELEPHONE (OUTPATIENT)
Age: 28
End: 2023-11-06

## 2023-11-22 ENCOUNTER — TELEPHONE (OUTPATIENT)
Age: 28
End: 2023-11-22

## 2023-11-22 ENCOUNTER — TELEMEDICINE (OUTPATIENT)
Age: 28
End: 2023-11-22
Payer: MEDICAID

## 2023-11-22 DIAGNOSIS — Z63.0 PARTNER RELATIONSHIP PROBLEM: ICD-10-CM

## 2023-11-22 DIAGNOSIS — J30.9 ALLERGIC RHINITIS, UNSPECIFIED SEASONALITY, UNSPECIFIED TRIGGER: ICD-10-CM

## 2023-11-22 DIAGNOSIS — F98.8 ATTENTION DEFICIT DISORDER, UNSPECIFIED HYPERACTIVITY PRESENCE: Primary | ICD-10-CM

## 2023-11-22 DIAGNOSIS — F41.1 GENERALIZED ANXIETY DISORDER: ICD-10-CM

## 2023-11-22 DIAGNOSIS — L71.0 PERIORAL DERMATITIS: ICD-10-CM

## 2023-11-22 DIAGNOSIS — F42.9 OBSESSIVE-COMPULSIVE DISORDER, UNSPECIFIED TYPE: ICD-10-CM

## 2023-11-22 PROCEDURE — 99214 OFFICE O/P EST MOD 30 MIN: CPT | Performed by: PHYSICIAN ASSISTANT

## 2023-11-22 RX ORDER — BUPROPION HYDROCHLORIDE 150 MG/1
150 TABLET ORAL EVERY MORNING
Qty: 90 TABLET | Refills: 1 | Status: SHIPPED | OUTPATIENT
Start: 2023-11-22

## 2023-11-22 SDOH — SOCIAL STABILITY - SOCIAL INSECURITY: PROBLEMS IN RELATIONSHIP WITH SPOUSE OR PARTNER: Z63.0

## 2023-11-22 ASSESSMENT — ENCOUNTER SYMPTOMS
COUGH: 0
SHORTNESS OF BREATH: 0

## 2023-11-22 NOTE — PROGRESS NOTES
I have reviewed all needed documentation in preparation for visit. Verified patient by name and date of birth  Chief Complaint   Patient presents with    Other     6 weeks F/U- allergy shots issues-       There were no vitals filed for this visit. Health Maintenance Due   Topic Date Due    Hepatitis B vaccine (1 of 3 - 3-dose series) Never done    Varicella vaccine (1 of 2 - 2-dose childhood series) Never done    HIV screen  Never done    Hepatitis C screen  Never done    DTaP/Tdap/Td vaccine (1 - Tdap) Never done    Flu vaccine (1) 08/01/2023    COVID-19 Vaccine (2 - 2023-24 season) 09/01/2023       1. \"Have you been to the ER, urgent care clinic since your last visit? Hospitalized since your last visit? \" No    2. \"Have you seen or consulted any other health care providers outside of the [address] [place] [address] since your last visit? \" YES     3. For patients aged 43-73: Has the patient had a colonoscopy / FIT/ Cologuard? NA - based on age      If the patient is female:    4. For patients aged 43-66: Has the patient had a mammogram within the past 2 years? NA - based on age or sex      11. For patients aged 21-65: Has the patient had a pap smear?  Yes - no Care Gap present        Dillon Combs, [facility]

## 2023-11-22 NOTE — PROGRESS NOTES
Rosalio Larson is a 25 y.o. female who was seen by synchronous (real-time) audio-video technology on 11/22/2023    Consent: Rosalio Larson, who was seen by synchronous (real-time) audio-video technology, and/or her healthcare decision maker, is aware that this patient-initiated, Telehealth encounter on 11/22/2023 is a billable service, with coverage as determined by her insurance carrier. She is aware that she may receive a bill and has provided verbal consent to proceed: YES  Subjective:   Rosalio Larson is a 25 y.o. female who was seen for Other (6 weeks F/U- allergy shots issues-)    Wellbutrin is helping adequately with ADD and pt hasn't had to take Ritalin! 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 OCD is adequately controlled. Seeing couples + individual counselor and feeling good. Perioral dermatitis resolved off of topical steroid. Seeing allergist for allergy shots regularly. Had some numbness/tingling in arm after shot the past two times. Going to try having it placed in leg next time. Health Maintenance Due   Topic Date Due    Hepatitis B vaccine (1 of 3 - 3-dose series) Never done    Varicella vaccine (1 of 2 - 2-dose childhood series) Never done    HIV screen  Never done    Hepatitis C screen  Never done    DTaP/Tdap/Td vaccine (1 - Tdap) Never done    Flu vaccine (1) 08/01/2023    COVID-19 Vaccine (2 - 2023-24 season) 09/01/2023     Review of Systems   Constitutional:  Negative for fever. Respiratory:  Negative for cough and shortness of breath. Cardiovascular:  Negative for chest pain and palpitations. Neurological:  Negative for headaches. Psychiatric/Behavioral:  Negative for dysphoric mood.            Objective:         11/22/2023     8:39 AM   Patient-Reported Vitals   Patient-Reported Weight 108lb   Patient-Reported Height 5'      General: alert, cooperative, no distress   Mental  status: normal mood, behavior, speech,

## 2024-04-15 ENCOUNTER — OFFICE VISIT (OUTPATIENT)
Age: 29
End: 2024-04-15
Payer: MEDICAID

## 2024-04-15 VITALS
TEMPERATURE: 98.9 F | HEART RATE: 78 BPM | HEIGHT: 60 IN | WEIGHT: 114 LBS | RESPIRATION RATE: 18 BRPM | OXYGEN SATURATION: 98 % | BODY MASS INDEX: 22.38 KG/M2 | SYSTOLIC BLOOD PRESSURE: 115 MMHG | DIASTOLIC BLOOD PRESSURE: 71 MMHG

## 2024-04-15 DIAGNOSIS — Z00.00 ANNUAL PHYSICAL EXAM: ICD-10-CM

## 2024-04-15 DIAGNOSIS — Z00.00 ANNUAL PHYSICAL EXAM: Primary | ICD-10-CM

## 2024-04-15 DIAGNOSIS — F98.8 ATTENTION DEFICIT DISORDER, UNSPECIFIED HYPERACTIVITY PRESENCE: ICD-10-CM

## 2024-04-15 PROCEDURE — 99395 PREV VISIT EST AGE 18-39: CPT | Performed by: PHYSICIAN ASSISTANT

## 2024-04-15 RX ORDER — BUPROPION HYDROCHLORIDE 300 MG/1
300 TABLET ORAL EVERY MORNING
Qty: 90 TABLET | Refills: 1 | Status: SHIPPED | OUTPATIENT
Start: 2024-04-15

## 2024-04-15 RX ORDER — EPINEPHRINE 0.3 MG/.3ML
INJECTION SUBCUTANEOUS
COMMUNITY

## 2024-04-15 ASSESSMENT — ENCOUNTER SYMPTOMS
BLOOD IN STOOL: 0
ABDOMINAL PAIN: 0
CONSTIPATION: 0
SINUS PAIN: 0
SORE THROAT: 0
NAUSEA: 0
SHORTNESS OF BREATH: 0
BACK PAIN: 0
VOMITING: 0
SINUS PRESSURE: 0
DIARRHEA: 0
EYE PAIN: 0
COUGH: 0

## 2024-04-15 ASSESSMENT — PATIENT HEALTH QUESTIONNAIRE - PHQ9
SUM OF ALL RESPONSES TO PHQ QUESTIONS 1-9: 0
2. FEELING DOWN, DEPRESSED OR HOPELESS: NOT AT ALL
SUM OF ALL RESPONSES TO PHQ QUESTIONS 1-9: 0
SUM OF ALL RESPONSES TO PHQ QUESTIONS 1-9: 0
1. LITTLE INTEREST OR PLEASURE IN DOING THINGS: NOT AT ALL
SUM OF ALL RESPONSES TO PHQ9 QUESTIONS 1 & 2: 0
SUM OF ALL RESPONSES TO PHQ QUESTIONS 1-9: 0

## 2024-04-15 NOTE — PROGRESS NOTES
I have reviewed all needed documentation in preparation for visit. Verified patient by name and date of birth  Chief Complaint   Patient presents with    Annual Exam     No concerns        Vitals:    04/15/24 1535   BP: 115/71   Site: Right Upper Arm   Position: Sitting   Cuff Size: Medium Adult   Pulse: 78   Resp: 18   Temp: 98.9 °F (37.2 °C)   TempSrc: Temporal   SpO2: 98%   Weight: 51.7 kg (114 lb)   Height: 1.524 m (5')       Health Maintenance Due   Topic Date Due    Hepatitis B vaccine (1 of 3 - 3-dose series) Never done    Varicella vaccine (2 of 2 - 2-dose childhood series) 10/12/1998    HIV screen  Never done    Hepatitis C screen  Never done    COVID-19 Vaccine (5 - 2023-24 season) 09/01/2023    Depression Screen  04/28/2024     \"Have you been to the ER, urgent care clinic since your last visit?  Hospitalized since your last visit?\"    NO    “Have you seen or consulted any other health care providers outside of [facility] since your last visit?”    NO            Click Here for Release of Records Request         Liz hummel [facility]  
palpitations and leg swelling.   Gastrointestinal:  Negative for abdominal pain, blood in stool, constipation, diarrhea, nausea and vomiting.   Endocrine: Negative for polydipsia.   Genitourinary:  Negative for difficulty urinating, dysuria, frequency, hematuria and urgency.   Musculoskeletal:  Negative for back pain, gait problem and neck pain.   Skin:  Negative for rash.   Allergic/Immunologic: Negative for immunocompromised state.   Neurological:  Negative for dizziness, seizures, syncope and headaches.   Hematological:  Negative for adenopathy.   Psychiatric/Behavioral:  Negative for behavioral problems, dysphoric mood, sleep disturbance and suicidal ideas. The patient is not nervous/anxious.      Physical Exam  Vitals and nursing note reviewed.   Constitutional:       General: She is not in acute distress.     Appearance: Normal appearance. She is not ill-appearing.   HENT:      Head: Normocephalic and atraumatic.      Right Ear: Tympanic membrane, ear canal and external ear normal.      Left Ear: Tympanic membrane, ear canal and external ear normal.      Nose: Nose normal.      Mouth/Throat:      Mouth: Mucous membranes are moist.   Eyes:      Extraocular Movements: Extraocular movements intact.      Conjunctiva/sclera: Conjunctivae normal.      Pupils: Pupils are equal, round, and reactive to light.   Neck:      Vascular: No carotid bruit.   Cardiovascular:      Rate and Rhythm: Normal rate and regular rhythm.      Heart sounds: Normal heart sounds.   Pulmonary:      Effort: Pulmonary effort is normal.      Breath sounds: Normal breath sounds.   Abdominal:      Palpations: Abdomen is soft. There is no mass.      Tenderness: There is no abdominal tenderness. There is no right CVA tenderness or left CVA tenderness.   Musculoskeletal:         General: No swelling or signs of injury.      Cervical back: Neck supple.   Lymphadenopathy:      Cervical: No cervical adenopathy.   Skin:     General: Skin is warm and

## 2024-05-09 ENCOUNTER — PATIENT MESSAGE (OUTPATIENT)
Age: 29
End: 2024-05-09

## 2024-05-09 DIAGNOSIS — F41.1 GENERALIZED ANXIETY DISORDER: Primary | ICD-10-CM

## 2024-05-10 RX ORDER — BUPROPION HYDROCHLORIDE 150 MG/1
150 TABLET ORAL EVERY MORNING
Qty: 90 TABLET | Refills: 1 | Status: SHIPPED | OUTPATIENT
Start: 2024-05-10

## 2024-05-10 NOTE — TELEPHONE ENCOUNTER
From: Rika Cage  To: Rika Peña  Sent: 5/9/2024 4:05 PM EDT  Subject: New dose of Wellbutrin    Hi!     I feel like the 300mg of Wellbutrin may be making me anxious. I don’t feel anxious all the time, but have been feeling more anxious than usual. Should I keep taking it and give it some more time? Or should I go back to the 150mg and see if that helps?     Thank you!!

## 2024-06-12 ENCOUNTER — OFFICE VISIT (OUTPATIENT)
Age: 29
End: 2024-06-12
Payer: MEDICAID

## 2024-06-12 VITALS
BODY MASS INDEX: 22.58 KG/M2 | HEART RATE: 73 BPM | DIASTOLIC BLOOD PRESSURE: 67 MMHG | TEMPERATURE: 98.6 F | WEIGHT: 115 LBS | OXYGEN SATURATION: 100 % | RESPIRATION RATE: 18 BRPM | SYSTOLIC BLOOD PRESSURE: 104 MMHG | HEIGHT: 60 IN

## 2024-06-12 DIAGNOSIS — M77.8 RIGHT WRIST TENDONITIS: Primary | ICD-10-CM

## 2024-06-12 PROCEDURE — 99213 OFFICE O/P EST LOW 20 MIN: CPT | Performed by: PHYSICIAN ASSISTANT

## 2024-06-12 PROCEDURE — G2211 COMPLEX E/M VISIT ADD ON: HCPCS | Performed by: PHYSICIAN ASSISTANT

## 2024-06-12 RX ORDER — MELOXICAM 15 MG/1
15 TABLET ORAL DAILY PRN
Qty: 90 TABLET | Refills: 1 | Status: SHIPPED | OUTPATIENT
Start: 2024-06-12

## 2024-06-12 SDOH — ECONOMIC STABILITY: FOOD INSECURITY: WITHIN THE PAST 12 MONTHS, YOU WORRIED THAT YOUR FOOD WOULD RUN OUT BEFORE YOU GOT MONEY TO BUY MORE.: NEVER TRUE

## 2024-06-12 SDOH — ECONOMIC STABILITY: FOOD INSECURITY: WITHIN THE PAST 12 MONTHS, THE FOOD YOU BOUGHT JUST DIDN'T LAST AND YOU DIDN'T HAVE MONEY TO GET MORE.: NEVER TRUE

## 2024-06-12 SDOH — ECONOMIC STABILITY: INCOME INSECURITY: HOW HARD IS IT FOR YOU TO PAY FOR THE VERY BASICS LIKE FOOD, HOUSING, MEDICAL CARE, AND HEATING?: NOT HARD AT ALL

## 2024-06-12 ASSESSMENT — ENCOUNTER SYMPTOMS
COUGH: 0
SHORTNESS OF BREATH: 0

## 2024-06-12 NOTE — PROGRESS NOTES
I have reviewed all needed documentation in preparation for visit. Verified patient by name and date of birth  Chief Complaint   Patient presents with    Pain     Right thumb and wrist are bothering her-        Vitals:    06/12/24 1532   BP: 104/67   Site: Right Upper Arm   Position: Sitting   Cuff Size: Medium Adult   Pulse: 73   Resp: 18   Temp: 98.6 °F (37 °C)   TempSrc: Temporal   SpO2: 100%   Weight: 52.2 kg (115 lb)   Height: 1.524 m (5')       Health Maintenance Due   Topic Date Due    COVID-19 Vaccine (5 - 2023-24 season) 09/01/2023     \"Have you been to the ER, urgent care clinic since your last visit?  Hospitalized since your last visit?\"    NO    “Have you seen or consulted any other health care providers outside of [facility] since your last visit?”    YES            Click Here for Release of Records Request         Liz hummel CMA  
Status: She is alert and oriented to person, place, and time.   Psychiatric:         Mood and Affect: Mood normal.         Behavior: Behavior normal.         Thought Content: Thought content normal.         Judgment: Judgment normal.       ASSESSMENT/PLAN    ICD-10-CM    1. Right wrist tendonitis  M77.8 meloxicam (MOBIC) 15 MG tablet     Ambulatory referral to Orthopedic Surgery       INI 2 weeks, follow up with Ortho. Continue bracing. Discussed ergonomics.

## 2024-07-19 ENCOUNTER — OFFICE VISIT (OUTPATIENT)
Age: 29
End: 2024-07-19
Payer: MEDICAID

## 2024-07-19 VITALS
DIASTOLIC BLOOD PRESSURE: 68 MMHG | TEMPERATURE: 97.8 F | BODY MASS INDEX: 22.19 KG/M2 | RESPIRATION RATE: 18 BRPM | WEIGHT: 113 LBS | HEIGHT: 60 IN | SYSTOLIC BLOOD PRESSURE: 103 MMHG | HEART RATE: 94 BPM | OXYGEN SATURATION: 100 %
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
## 2024-07-19 DIAGNOSIS — Z23 NEED FOR PROPHYLACTIC VACCINATION AGAINST DIPHTHERIA-TETANUS-PERTUSSIS (DTP): ICD-10-CM

## 2024-07-19 PROCEDURE — 99213 OFFICE O/P EST LOW 20 MIN: CPT | Performed by: PHYSICIAN ASSISTANT

## 2024-07-19 PROCEDURE — 90715 TDAP VACCINE 7 YRS/> IM: CPT | Performed by: PHYSICIAN ASSISTANT

## 2024-07-19 ASSESSMENT — ENCOUNTER SYMPTOMS
SHORTNESS OF BREATH: 0
COUGH: 0

## 2024-07-19 NOTE — PROGRESS NOTES
I have reviewed all needed documentation in preparation for visit. Verified patient by name and date of birth  Chief Complaint   Patient presents with    Animal Bite     Got bit by a squirrel- bite was yesterday- left index finger- wants a tdap shot-        Vitals:    07/19/24 1339   BP: 103/68   Site: Right Upper Arm   Position: Sitting   Cuff Size: Medium Adult   Pulse: 94   Resp: 18   Temp: 97.8 °F (36.6 °C)   TempSrc: Temporal   SpO2: 100%   Weight: 51.3 kg (113 lb)   Height: 1.524 m (5')       Health Maintenance Due   Topic Date Due    COVID-19 Vaccine (5 - 2023-24 season) 09/01/2023     \"Have you been to the ER, urgent care clinic since your last visit?  Hospitalized since your last visit?\"    NO    “Have you seen or consulted any other health care providers outside of [facility] since your last visit?”    NO            Click Here for Release of Records Request         Liz hummel CMA

## 2024-07-19 NOTE — PROGRESS NOTES
Rika Cage is a 29 y.o. female  Chief Complaint   Patient presents with    Animal Bite     Got bit by a squirrel- bite was yesterday- left index finger- wants a tdap shot-      Vitals:    07/19/24 1339   BP: 103/68   Pulse: 94   Resp: 18   Temp: 97.8 °F (36.6 °C)   SpO2: 100%      Wt Readings from Last 3 Encounters:   07/19/24 51.3 kg (113 lb)   06/12/24 52.2 kg (115 lb)   04/15/24 51.7 kg (114 lb)     BMI Readings from Last 3 Encounters:   07/19/24 22.07 kg/m²   06/12/24 22.46 kg/m²   04/15/24 22.26 kg/m²     Health Maintenance Due   Topic Date Due    COVID-19 Vaccine (5 - 2023-24 season) 09/01/2023     HPI  Pt has been feeding the squirrels in her yard for a long time and yesterday was hand feeding. Got a shallow bite from a squirrel named Spot. Rinsed x 5 minutes, neosporin, wrapped. Doing well.     ROS  Review of Systems   Constitutional:  Negative for fever.   Respiratory:  Negative for cough and shortness of breath.    Cardiovascular:  Negative for chest pain and palpitations.   Neurological:  Negative for headaches.   Psychiatric/Behavioral:  Negative for dysphoric mood.      EXAM  Physical Exam  Vitals and nursing note reviewed.   Constitutional:       Appearance: Normal appearance.   HENT:      Head: Normocephalic and atraumatic.   Neck:      Vascular: No carotid bruit.   Cardiovascular:      Rate and Rhythm: Normal rate and regular rhythm.      Heart sounds: Normal heart sounds.   Pulmonary:      Effort: Pulmonary effort is normal. No respiratory distress.      Breath sounds: Normal breath sounds.   Musculoskeletal:      Cervical back: Neck supple.   Skin:     General: Skin is warm and dry.      Comments: L index finger with tiny shallow puncture wound. No erythema/excess warmth.    Neurological:      General: No focal deficit present.      Mental Status: She is alert and oriented to person, place, and time.   Psychiatric:         Mood and Affect: Mood normal.         Behavior: Behavior normal.

## 2024-10-15 ENCOUNTER — OFFICE VISIT (OUTPATIENT)
Age: 29
End: 2024-10-15
Payer: MEDICAID

## 2024-10-15 VITALS
DIASTOLIC BLOOD PRESSURE: 83 MMHG | HEIGHT: 60 IN | TEMPERATURE: 97.6 F | SYSTOLIC BLOOD PRESSURE: 125 MMHG | BODY MASS INDEX: 23.16 KG/M2 | OXYGEN SATURATION: 98 % | WEIGHT: 118 LBS | HEART RATE: 93 BPM | RESPIRATION RATE: 16 BRPM

## 2024-10-15 DIAGNOSIS — Z00.00 ANNUAL PHYSICAL EXAM: ICD-10-CM

## 2024-10-15 DIAGNOSIS — G56.03 BILATERAL CARPAL TUNNEL SYNDROME: ICD-10-CM

## 2024-10-15 DIAGNOSIS — F41.1 GENERALIZED ANXIETY DISORDER: Primary | ICD-10-CM

## 2024-10-15 DIAGNOSIS — M77.8 RIGHT WRIST TENDONITIS: ICD-10-CM

## 2024-10-15 PROCEDURE — 99214 OFFICE O/P EST MOD 30 MIN: CPT | Performed by: PHYSICIAN ASSISTANT

## 2024-10-15 PROCEDURE — G2211 COMPLEX E/M VISIT ADD ON: HCPCS | Performed by: PHYSICIAN ASSISTANT

## 2024-10-15 RX ORDER — BUPROPION HYDROCHLORIDE 150 MG/1
150 TABLET ORAL EVERY MORNING
Qty: 90 TABLET | Refills: 1 | Status: SHIPPED | OUTPATIENT
Start: 2024-10-15

## 2024-10-15 RX ORDER — MELOXICAM 15 MG/1
15 TABLET ORAL DAILY PRN
Qty: 90 TABLET | Refills: 1 | Status: SHIPPED | OUTPATIENT
Start: 2024-10-15

## 2024-10-15 ASSESSMENT — ENCOUNTER SYMPTOMS
COUGH: 0
SHORTNESS OF BREATH: 0

## 2024-10-15 NOTE — PROGRESS NOTES
I have reviewed all needed documentation in preparation for visit. Verified patient by name and date of birth  Chief Complaint   Patient presents with    6 Month Follow-Up       There were no vitals filed for this visit.    Health Maintenance Due   Topic Date Due    Flu vaccine (1) 08/01/2024    COVID-19 Vaccine (5 - 2023-24 season) 09/01/2024     \"Have you been to the ER, urgent care clinic since your last visit?  Hospitalized since your last visit?\"    NO    “Have you seen or consulted any other health care providers outside of [facility] since your last visit?”    NO            Click Here for Release of Records Request         Ulysses Lyman [facility]

## 2024-10-15 NOTE — PROGRESS NOTES
Rika Cage is a 29 y.o. female  Chief Complaint   Patient presents with    6 Month Follow-Up     Vitals:    10/15/24 1549   BP: 125/83   Pulse: 93   Resp: 16   Temp: 97.6 °F (36.4 °C)   SpO2: 98%      Wt Readings from Last 3 Encounters:   10/15/24 53.5 kg (118 lb)   24 51.3 kg (113 lb)   24 51.3 kg (113 lb)     BMI Readings from Last 3 Encounters:   10/15/24 23.05 kg/m²   24 22.07 kg/m²   24 22.07 kg/m²     Health Maintenance Due   Topic Date Due    Flu vaccine (1) 2024    COVID-19 Vaccine ( -  season) 2024     HPI  Seeing Ortho Hand (Dr. Pires) for Carpal Tunnel. May not need surgery and hoping that is the case.     Anxiety is well controlled on Wellbutrin.     Didn't do labs previously ordered for CE and now they are . Needs new orders.     ROS  Review of Systems   Constitutional:  Negative for fever.   Respiratory:  Negative for cough and shortness of breath.    Cardiovascular:  Negative for chest pain and palpitations.   Neurological:  Negative for headaches.   Psychiatric/Behavioral:  Negative for dysphoric mood.      EXAM  Physical Exam  Vitals and nursing note reviewed.   Constitutional:       Appearance: Normal appearance.   HENT:      Head: Normocephalic and atraumatic.   Neck:      Vascular: No carotid bruit.   Cardiovascular:      Rate and Rhythm: Normal rate and regular rhythm.      Heart sounds: Normal heart sounds.   Pulmonary:      Effort: Pulmonary effort is normal. No respiratory distress.      Breath sounds: Normal breath sounds.   Musculoskeletal:      Cervical back: Neck supple.   Skin:     General: Skin is warm and dry.   Neurological:      General: No focal deficit present.      Mental Status: She is alert and oriented to person, place, and time.   Psychiatric:         Mood and Affect: Mood normal.         Behavior: Behavior normal.         Thought Content: Thought content normal.         Judgment: Judgment normal.

## 2024-12-02 ENCOUNTER — OFFICE VISIT (OUTPATIENT)
Age: 29
End: 2024-12-02

## 2024-12-02 VITALS
WEIGHT: 115 LBS | OXYGEN SATURATION: 98 % | HEART RATE: 93 BPM | SYSTOLIC BLOOD PRESSURE: 124 MMHG | HEIGHT: 60 IN | RESPIRATION RATE: 18 BRPM | DIASTOLIC BLOOD PRESSURE: 84 MMHG | TEMPERATURE: 97.9 F | BODY MASS INDEX: 22.58 KG/M2

## 2024-12-02 DIAGNOSIS — K52.1 DIARRHEA DUE TO DRUG: ICD-10-CM

## 2024-12-02 DIAGNOSIS — J45.20 MILD INTERMITTENT ASTHMA, UNSPECIFIED WHETHER COMPLICATED: ICD-10-CM

## 2024-12-02 DIAGNOSIS — J01.90 ACUTE NON-RECURRENT SINUSITIS, UNSPECIFIED LOCATION: Primary | ICD-10-CM

## 2024-12-02 PROBLEM — Z63.0 PARTNER RELATIONSHIP PROBLEM: Status: RESOLVED | Noted: 2023-05-22 | Resolved: 2024-12-02

## 2024-12-02 PROCEDURE — 99213 OFFICE O/P EST LOW 20 MIN: CPT | Performed by: PHYSICIAN ASSISTANT

## 2024-12-02 RX ORDER — ALBUTEROL SULFATE 0.83 MG/ML
2.5 SOLUTION RESPIRATORY (INHALATION) EVERY 4 HOURS PRN
Qty: 100 EACH | Refills: 3 | Status: SHIPPED | OUTPATIENT
Start: 2024-12-02

## 2024-12-02 RX ORDER — METHYLPREDNISOLONE 4 MG/1
TABLET ORAL
Qty: 1 KIT | Refills: 0 | Status: SHIPPED | OUTPATIENT
Start: 2024-12-02

## 2024-12-02 ASSESSMENT — ENCOUNTER SYMPTOMS
SHORTNESS OF BREATH: 0
COUGH: 0

## 2024-12-02 NOTE — PROGRESS NOTES
I have reviewed all needed documentation in preparation for visit. Verified patient by name and date of birth  Chief Complaint   Patient presents with    Pharyngitis     Sxs Nausea and dry cough        There were no vitals filed for this visit.    Health Maintenance Due   Topic Date Due    Flu vaccine (1) 08/01/2024     \"Have you been to the ER, urgent care clinic since your last visit?  Hospitalized since your last visit?\"    [facility] days ago     Dx with Ear Infection Lt EAR   “Have you seen or consulted any other health care providers outside of [facility] since your last visit?”    NO            Click Here for Release of Records Request         Ulysses Lyman [facility]

## 2024-12-02 NOTE — PROGRESS NOTES
Rika Cage is a 29 y.o. female  Chief Complaint   Patient presents with    Pharyngitis     Started on Last Wednesday  Sxs   Nausea and dry cough , Congestion and Cough has worsen the patient went to [facility] on 2 days ago neg for Flu, Covid and Strep .No Fever .Fatigue     Vitals:    12/02/24 0841   BP: 124/84   Pulse: 93   Resp: 18   Temp: 97.9 °F (36.6 °C)   SpO2: 98%      Wt Readings from Last 3 Encounters:   12/02/24 52.2 kg (115 lb)   10/15/24 53.5 kg (118 lb)   09/03/24 51.3 kg (113 lb)     BMI Readings from Last 3 Encounters:   12/02/24 22.46 kg/m²   10/15/24 23.05 kg/m²   09/03/24 22.07 kg/m²     Health Maintenance Due   Topic Date Due    Flu vaccine (1) 08/01/2024     HPI  Monday sore throat. Tuesday nothing. Wednesday couging and sore throat returned. Thurs/Friday throat was very sore. Went to [facility] on Saturday and dx with an ear infection.   On AB for ear infection: Augmentin BID and left over Prednisone dose yesterday, Advil/Tylenol, natural cough syrup. Needed neb tx last night.   Green mucus when blowing nose.   Diarrhea with Augmentin. Needs work note.     ROS  Review of Systems   Constitutional:  Negative for fever.   Respiratory:  Negative for cough and shortness of breath.    Cardiovascular:  Negative for chest pain and palpitations.   Neurological:  Negative for headaches.   Psychiatric/Behavioral:  Negative for dysphoric mood.      EXAM  Physical Exam  Vitals and nursing note reviewed.   Constitutional:       General: She is not in acute distress.     Appearance: Normal appearance. She is not toxic-appearing.   HENT:      Head: Normocephalic and atraumatic.      Ears:      Comments: Bilateral TMs with fluid. No erythema.     Nose: Congestion and rhinorrhea present.      Mouth/Throat:      Mouth: Mucous membranes are moist.      Pharynx: Oropharynx is clear.   Eyes:      Extraocular Movements: Extraocular movements intact.      Conjunctiva/sclera: Conjunctivae normal.      Pupils:

## 2024-12-18 ENCOUNTER — PATIENT MESSAGE (OUTPATIENT)
Age: 29
End: 2024-12-18

## 2025-04-07 ENCOUNTER — TELEPHONE (OUTPATIENT)
Age: 30
End: 2025-04-07

## 2025-04-07 NOTE — TELEPHONE ENCOUNTER
Left vm and sent [employer] message informing patient that her 04/15 appointment has been cancelled as SUSHANT Obrien will not be in the office that day. Left number for patient to return call to reschedule for another date and time.

## 2025-04-10 DIAGNOSIS — F41.1 GENERALIZED ANXIETY DISORDER: ICD-10-CM

## 2025-04-10 RX ORDER — BUPROPION HYDROCHLORIDE 150 MG/1
150 TABLET ORAL EVERY MORNING
Qty: 90 TABLET | Refills: 0 | Status: SHIPPED | OUTPATIENT
Start: 2025-04-10 | End: 2025-04-17 | Stop reason: SDUPTHER

## 2025-04-10 NOTE — TELEPHONE ENCOUNTER
Refill request received from [facility]   for   Requested Prescriptions     Pending Prescriptions Disp Refills    buPROPion (WELLBUTRIN XL) 150 MG extended release tablet [Pharmacy Med Name: bupropion HCl  mg 24 hr tablet, extended release] 90 tablet 1     Sig: TAKE 1 TABLET BY MOUTH EVERY MORNING     Last office visit: 12/2/2024   Next office visit: Visit date not found     Routed to SUSHANT Obrien for review.     Libia Soriano LPN

## 2025-04-14 LAB
ALBUMIN SERPL-MCNC: 4 G/DL (ref 3.5–5)
ALBUMIN/GLOB SERPL: 1.2 (ref 1.1–2.2)
ALP SERPL-CCNC: 68 U/L (ref 45–117)
ALT SERPL-CCNC: 16 U/L (ref 12–78)
ANION GAP SERPL CALC-SCNC: 6 MMOL/L (ref 2–12)
AST SERPL-CCNC: 16 U/L (ref 15–37)
BILIRUB SERPL-MCNC: 0.5 MG/DL (ref 0.2–1)
BUN SERPL-MCNC: 11 MG/DL (ref 6–20)
BUN/CREAT SERPL: 16 (ref 12–20)
CALCIUM SERPL-MCNC: 9.8 MG/DL (ref 8.5–10.1)
CHLORIDE SERPL-SCNC: 107 MMOL/L (ref 97–108)
CHOLEST SERPL-MCNC: 238 MG/DL
CO2 SERPL-SCNC: 26 MMOL/L (ref 21–32)
CREAT SERPL-MCNC: 0.69 MG/DL (ref 0.55–1.02)
ERYTHROCYTE [DISTWIDTH] IN BLOOD BY AUTOMATED COUNT: 12.3 % (ref 11.5–14.5)
EST. AVERAGE GLUCOSE BLD GHB EST-MCNC: 100 MG/DL
GLOBULIN SER CALC-MCNC: 3.3 G/DL (ref 2–4)
GLUCOSE SERPL-MCNC: 99 MG/DL (ref 65–100)
HBA1C MFR BLD: 5.1 % (ref 4–5.6)
HCT VFR BLD AUTO: 41.5 % (ref 35–47)
HDLC SERPL-MCNC: 66 MG/DL
HDLC SERPL: 3.6 (ref 0–5)
HGB BLD-MCNC: 13.6 G/DL (ref 11.5–16)
LDLC SERPL CALC-MCNC: 156.2 MG/DL (ref 0–100)
MCH RBC QN AUTO: 29.6 PG (ref 26–34)
MCHC RBC AUTO-ENTMCNC: 32.8 G/DL (ref 30–36.5)
MCV RBC AUTO: 90.4 FL (ref 80–99)
NRBC # BLD: 0 K/UL (ref 0–0.01)
NRBC BLD-RTO: 0 PER 100 WBC
PLATELET # BLD AUTO: 373 K/UL (ref 150–400)
PMV BLD AUTO: 10 FL (ref 8.9–12.9)
POTASSIUM SERPL-SCNC: 4.2 MMOL/L (ref 3.5–5.1)
PROT SERPL-MCNC: 7.3 G/DL (ref 6.4–8.2)
RBC # BLD AUTO: 4.59 M/UL (ref 3.8–5.2)
SODIUM SERPL-SCNC: 139 MMOL/L (ref 136–145)
TRIGL SERPL-MCNC: 79 MG/DL
VLDLC SERPL CALC-MCNC: 15.8 MG/DL
WBC # BLD AUTO: 6.5 K/UL (ref 3.6–11)

## 2025-04-15 LAB — TSH SERPL DL<=0.05 MIU/L-ACNC: 0.98 UIU/ML (ref 0.45–4.5)

## 2025-04-17 ENCOUNTER — OFFICE VISIT (OUTPATIENT)
Age: 30
End: 2025-04-17
Payer: COMMERCIAL

## 2025-04-17 VITALS
DIASTOLIC BLOOD PRESSURE: 69 MMHG | WEIGHT: 116 LBS | RESPIRATION RATE: 18 BRPM | HEART RATE: 76 BPM | OXYGEN SATURATION: 95 % | SYSTOLIC BLOOD PRESSURE: 109 MMHG | BODY MASS INDEX: 21.9 KG/M2 | HEIGHT: 61 IN | TEMPERATURE: 97.6 F

## 2025-04-17 DIAGNOSIS — F41.1 GENERALIZED ANXIETY DISORDER: ICD-10-CM

## 2025-04-17 DIAGNOSIS — E78.00 HYPERCHOLESTEREMIA: ICD-10-CM

## 2025-04-17 DIAGNOSIS — Z00.00 ANNUAL PHYSICAL EXAM: Primary | ICD-10-CM

## 2025-04-17 PROCEDURE — 99395 PREV VISIT EST AGE 18-39: CPT | Performed by: PHYSICIAN ASSISTANT

## 2025-04-17 RX ORDER — BUPROPION HYDROCHLORIDE 150 MG/1
150 TABLET ORAL EVERY MORNING
Qty: 90 TABLET | Refills: 1 | Status: SHIPPED | OUTPATIENT
Start: 2025-04-17

## 2025-04-17 SDOH — ECONOMIC STABILITY: FOOD INSECURITY: WITHIN THE PAST 12 MONTHS, THE FOOD YOU BOUGHT JUST DIDN'T LAST AND YOU DIDN'T HAVE MONEY TO GET MORE.: NEVER TRUE

## 2025-04-17 SDOH — ECONOMIC STABILITY: FOOD INSECURITY: WITHIN THE PAST 12 MONTHS, YOU WORRIED THAT YOUR FOOD WOULD RUN OUT BEFORE YOU GOT MONEY TO BUY MORE.: NEVER TRUE

## 2025-04-17 ASSESSMENT — ENCOUNTER SYMPTOMS
DIARRHEA: 0
SORE THROAT: 0
VOMITING: 0
ABDOMINAL PAIN: 0
NAUSEA: 0
BACK PAIN: 0
COUGH: 0
BLOOD IN STOOL: 0
CONSTIPATION: 0
SINUS PAIN: 0
SHORTNESS OF BREATH: 0
EYE PAIN: 0
SINUS PRESSURE: 0

## 2025-04-17 ASSESSMENT — PATIENT HEALTH QUESTIONNAIRE - PHQ9
SUM OF ALL RESPONSES TO PHQ QUESTIONS 1-9: 0
2. FEELING DOWN, DEPRESSED OR HOPELESS: NOT AT ALL
1. LITTLE INTEREST OR PLEASURE IN DOING THINGS: NOT AT ALL

## 2025-04-17 NOTE — PROGRESS NOTES
I have reviewed all needed documentation in preparation for visit. Verified patient by name and date of birth  Chief Complaint   Patient presents with    Annual Exam     No concerns        Vitals:    04/17/25 0837   BP: 109/69   BP Site: Right Upper Arm   Patient Position: Sitting   BP Cuff Size: Medium Adult   Pulse: 76   Resp: 18   Temp: 97.6 °F (36.4 °C)   TempSrc: Temporal   SpO2: 95%   Weight: 52.6 kg (116 lb)   Height: 1.542 m (5' 0.71\")       Health Maintenance Due   Topic Date Due    Pneumococcal 0-49 years Vaccine (1 of 2 - PCV) Never done    COVID-19 Vaccine (6 - 2024-25 season) 09/01/2024    Depression Screen  04/15/2025     \"Have you been to the ER, urgent care clinic since your last visit?  Hospitalized since your last visit?\"    NO    “Have you seen or consulted any other health care providers outside of [facility] since your last visit?”    NO            Click Here for Release of Records Request         Liz hummel CMA

## 2025-04-17 NOTE — PROGRESS NOTES
Rika Cage is a 29 y.o. female  Chief Complaint   Patient presents with    Annual Exam     No concerns      Vitals:    04/17/25 0837   BP: 109/69   Pulse: 76   Resp: 18   Temp: 97.6 °F (36.4 °C)   SpO2: 95%      Wt Readings from Last 3 Encounters:   04/17/25 52.6 kg (116 lb)   01/28/25 52.2 kg (115 lb)   12/02/24 52.2 kg (115 lb)     BMI Readings from Last 3 Encounters:   04/17/25 22.13 kg/m²   01/28/25 22.46 kg/m²   12/02/24 22.46 kg/m²     Health Maintenance Due   Topic Date Due    Pneumococcal 0-49 years Vaccine (1 of 2 - PCV) Never done    COVID-19 Vaccine (6 - 2024-25 season) 09/01/2024    Depression Screen  04/15/2025     Social History     Tobacco Use    Smoking status: Never    Smokeless tobacco: Never   Substance Use Topics    Alcohol use: Never    Marijuana nightly. ~ Twice/day on weekends.     Family History   Problem Relation Age of Onset    Alcohol Abuse Mother     Broken Bones Mother     Asthma Father     High Cholesterol Father     Arthritis Maternal Grandmother     Breast Cancer Maternal Grandmother     Cancer Maternal Grandmother     Colon Cancer Neg Hx       HPI  Pt presents for an Annual Physical. Had labs done recently. Lipids elevated but otherwise normal results. Feels she can work on cutting back on red meat.     Wt Readings from Last 3 Encounters:   04/17/25 52.6 kg (116 lb)   01/28/25 52.2 kg (115 lb)   12/02/24 52.2 kg (115 lb)     Anxiety is well controlled.     Review of Systems   Constitutional:  Negative for fever and unexpected weight change.   HENT:  Negative for congestion, sinus pressure, sinus pain and sore throat.    Eyes:  Negative for pain and visual disturbance.   Respiratory:  Negative for cough and shortness of breath.    Cardiovascular:  Negative for chest pain, palpitations and leg swelling.   Gastrointestinal:  Negative for abdominal pain, blood in stool, constipation, diarrhea, nausea and vomiting.   Endocrine: Negative for polydipsia.   Genitourinary:  Negative for

## 2025-07-28 ENCOUNTER — TELEPHONE (OUTPATIENT)
Age: 30
End: 2025-07-28

## 2025-07-28 NOTE — TELEPHONE ENCOUNTER
Left vm and sent MyChart message informing patient that SUSHANT Obrien will be leaving the practice at the end of September and her appt for 10/20 has been cancelled. Left number and requested patient return call to schedule an appointment to establish care with a new provider.